# Patient Record
(demographics unavailable — no encounter records)

---

## 2024-10-15 NOTE — PHYSICAL EXAM
[Alert] : alert [Oriented x 3] : ~L oriented x 3 [FreeTextEntry3] : - rough eczematous patches on b/l inner thighs - erythematous patches in medial thighs

## 2024-10-15 NOTE — ASSESSMENT
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 SED. Proper use reviewed including only using to affected area and avoidance of prolonged use. - After 1 more week switch to tacrolimus ointment BID with aquaphor on top - RTC in 4 weeks, if no resolution consider skin bx   RTC in 4 weeks

## 2024-10-15 NOTE — HISTORY OF PRESENT ILLNESS
[FreeTextEntry1] : RP: rash [de-identified] : GRACE TRIPLETT is a 37 year old who is presenting for evaluation of:  #Rash on inner thigh - Present for over a year, started when she switched her shower gel. Improved after stopping new shower gel but still persistent. Very itchy. Was using betamethasone from son and her PCP about 1-3 times a week. Using OTC HTC on other days. Initially improves but then flares. Using dove nonscented products. Flares in the heat.  - [facility]  9/17/24 has used the triamcinolone for 2 weeks with complete resolution of rash however flared when using aquaphor alone.

## 2024-11-20 NOTE — ASSESSMENT
[FreeTextEntry1] : # Folliculitis - May have been exacerbated/flared by topical steroid application - Stop triamcinolone/tacrolimus for now - Clinda lotion bid as needed, SED  #Dermatitis, Likely frictional contact dermatitis mostly PIH today, does not appear active - Stop anti-inflammatory topicals for now -If active: --tacrolimus oint bid, SED --if not improved adequately with tacrolimus can use triamcinolone 0.1% ointment BID x 2 weeks. SED. Proper use reviewed including only using to affected area and avoidance of prolonged use.  RTC 2 months

## 2024-11-20 NOTE — HISTORY OF PRESENT ILLNESS
[FreeTextEntry1] : RP: rash [de-identified] : GRACE TRIPLETT is a 37 year old hx of eczema who is presenting for evaluation of:  #Rash on inner thighs - Has been using triamcinolone first since last visit, then tacrolimus - Still itchy for her - Rash may look a bit different today then it has in the past  - Initial rash exacerbated by waxing

## 2024-11-20 NOTE — PHYSICAL EXAM
[Alert] : alert [Oriented x 3] : ~L oriented x 3 [FreeTextEntry3] : - hyperpigmented patches inguinal thighs - pink folliculocentric papules inguinal thighs